# Patient Record
Sex: MALE | Race: ASIAN | Employment: FULL TIME | URBAN - METROPOLITAN AREA
[De-identification: names, ages, dates, MRNs, and addresses within clinical notes are randomized per-mention and may not be internally consistent; named-entity substitution may affect disease eponyms.]

---

## 2022-07-22 ENCOUNTER — HOSPITAL ENCOUNTER (EMERGENCY)
Age: 47
Discharge: HOME | End: 2022-07-22
Payer: COMMERCIAL

## 2022-07-22 VITALS
HEART RATE: 94 BPM | SYSTOLIC BLOOD PRESSURE: 152 MMHG | DIASTOLIC BLOOD PRESSURE: 111 MMHG | WEIGHT: 220 LBS | RESPIRATION RATE: 15 BRPM | TEMPERATURE: 97.6 F | OXYGEN SATURATION: 100 % | HEIGHT: 68 IN | BODY MASS INDEX: 33.34 KG/M2

## 2022-07-22 DIAGNOSIS — L23.7 POISON IVY: ICD-10-CM

## 2022-07-22 DIAGNOSIS — Z51.89 VISIT FOR WOUND CHECK: Primary | ICD-10-CM

## 2022-07-22 LAB
CHP ED QC CHECK: NORMAL
GLUCOSE BLD-MCNC: 112 MG/DL
METER GLUCOSE: 112 MG/DL (ref 74–99)

## 2022-07-22 PROCEDURE — 6360000002 HC RX W HCPCS

## 2022-07-22 PROCEDURE — 96372 THER/PROPH/DIAG INJ SC/IM: CPT

## 2022-07-22 PROCEDURE — 99284 EMERGENCY DEPT VISIT MOD MDM: CPT

## 2022-07-22 RX ORDER — DEXAMETHASONE SODIUM PHOSPHATE 10 MG/ML
8 INJECTION INTRAMUSCULAR; INTRAVENOUS ONCE
Status: COMPLETED | OUTPATIENT
Start: 2022-07-22 | End: 2022-07-22

## 2022-07-22 RX ADMIN — DEXAMETHASONE SODIUM PHOSPHATE 8 MG: 10 INJECTION INTRAMUSCULAR; INTRAVENOUS at 17:02

## 2022-07-22 NOTE — ED PROVIDER NOTES
[address] [facility]  Department of Emergency Medicine   ED  Encounter Note  Admit Date/RoomTime: 2022  4:07 PM  ED Room: ROSIO/ROSIO    NAME: Melvi Estrada  : 1975  MRN: [id number]     Chief Complaint:  Wound Check and Poison Ivy (Started on ATB. For right leg wounds. Poison ivy left calf. Denies itching or pain/)    History of Present Illness        Melvi Estrada is a 49 y.o. old male who presents to the emergency department by private vehicle, for evaluation of wound located on right anterior lower leg, which occured a few day(s) prior to arrival.  The patient is currently on ATBx therapy. . The wound is / has clean, dry, no drainage, and healing. Patient states that he has been treated for a wound to his right lower leg due to having an infection. He states that today he noticed poison ivy to his right lower leg and his left lower leg which is new. He denies itching or pain. He denies fevers or chills. Patient states that he wanted to make sure that the wound was getting better since being on antibiotics. Patient appears well, nontoxic in no acute distress. No other complaints or concerns at this time. Tetanus Status:  up to date. ROS   Pertinent positives and negatives are stated within HPI, all other systems reviewed and are negative. Past Medical History:  has no past medical history on file. Surgical History:  has no past surgical history on file. Social History:      Family History: family history is not on file. Allergies: Patient has no known allergies. Physical Exam   Oxygen Saturation Interpretation: Normal.        ED Triage Vitals [22 1429]   BP Temp Temp src Heart Rate Resp SpO2 Height Weight   (!) 152/111 97.6 °F (36.4 °C) -- 94 15 100 % 5' 8\" (1.727 m) 220 lb (99.8 kg)         Physical Exam  Constitutional:  Alert, development consistent with age. HEENT:  NC/NT. Airway patent. Neck:  Normal ROM. Supple.   Respiratory: Clear to auscultation and breath sounds equal.  CV:  Regular rate and rhythm, normal heart sounds, without pathological murmurs, ectopy, gallops, or rubs. GI:  Abdomen Soft, nontender, good bowel sounds. No firm or pulsatile mass. Back:  No costovertebral tenderness. Extremities: No tenderness or edema noted. Integument: Patient has a healing wound measuring approximately 1 cm x 1 cm to the anterior aspect of the right lower leg. There is no drainage or surrounding erythema to the site. There is also multiple areas to the bilateral calfs that are vesicular in nature and has the appearance of poison ivy. Patient states that he was outside working, and may have gotten to contact with poison ivy. There is no surrounding erythema or evidence of secondary infection. No lymphangitis. Skin is warm and dry. Normal skin turgor. Lymphatics: No lymphangitis or adenopathy noted. Neurological:  Oriented. Motor functions intact. Lab / Imaging Results   (All laboratory and radiology results have been personally reviewed by myself)  Labs:  Results for orders placed or performed during the hospital encounter of 07/22/22   POCT glucose   Result Value Ref Range    Glucose 112 mg/dL    QC OK? ok    POCT Glucose   Result Value Ref Range    Meter Glucose 112 (H) 74 - 99 mg/dL     Imaging: All Radiology results interpreted by Radiologist unless otherwise noted. No orders to display     ED Course / Medical Decision Making     Medications   dexamethasone (DECADRON) injection 8 mg (8 mg IntraMUSCular Given 7/22/22 1702)         Consult(s):   None    Procedure(s):   none    MDM:   Patient presents to the emergency department for evaluation of a wound to his right lower leg that he is being treated with antibiotics for, because he wants to make sure it is healing. He has also noticed poison ivy to his bilateral lower legs. Wound is healing, without drainage or surrounding erythema noted. No lymphangitis noted.   Patient does have poison ivy noted to the bilateral calf. Patient given dose of steroids in the emergency department for the poison ivy, advised to make sure he finishes the rest of his antibiotics for his wound. Patient is afebrile, and appears well, nontoxic and in no acute distress. Plan is for discharge home with outpatient management and follow-up with PCP. Patient is agreeable to this plan. At this time the patient is without objective evidence of an acute process requiring hospitalization or inpatient management. They have remained hemodynamically stable throughout their entire ED visit and are stable for discharge with outpatient follow-up. The plan has been discussed in detail and they are aware of the specific conditions for emergent return, as well as the importance of follow-up. Plan of Care/Counseling:  VICKI Stoll CNP reviewed today's visit with the patient in addition to providing specific details for the plan of care and counseling regarding the diagnosis and prognosis. Questions are answered at this time and are agreeable with the plan. Assessment     1. Visit for wound check    2. Poison ivy      Plan   Discharged home. Patient condition is good    New Medications     Discharge Medication List as of 7/22/2022  5:09 PM        START taking these medications    Details   ZINC OXIDE, TOPICAL, 10 % CREA Apply 1 Squirt topically 2 times daily, Disp-78 g, R-0Print           Electronically signed by VICKI Stoll CNP   DD: 7/22/22  **This report was transcribed using voice recognition software. Every effort was made to ensure accuracy; however, inadvertent computerized transcription errors may be present.   END OF ED PROVIDER NOTE      VICKI Stoll CNP  07/22/22 2050